# Patient Record
Sex: FEMALE | Race: WHITE | HISPANIC OR LATINO | Employment: PART TIME | URBAN - METROPOLITAN AREA
[De-identification: names, ages, dates, MRNs, and addresses within clinical notes are randomized per-mention and may not be internally consistent; named-entity substitution may affect disease eponyms.]

---

## 2024-03-13 ENCOUNTER — OFFICE VISIT (OUTPATIENT)
Dept: MEDICAL GROUP | Facility: MEDICAL CENTER | Age: 24
End: 2024-03-13
Payer: MEDICAID

## 2024-03-13 VITALS
OXYGEN SATURATION: 96 % | SYSTOLIC BLOOD PRESSURE: 86 MMHG | DIASTOLIC BLOOD PRESSURE: 60 MMHG | BODY MASS INDEX: 18.29 KG/M2 | WEIGHT: 109.8 LBS | RESPIRATION RATE: 16 BRPM | TEMPERATURE: 98.1 F | HEART RATE: 75 BPM | HEIGHT: 65 IN

## 2024-03-13 DIAGNOSIS — Z87.42 HISTORY OF OVARIAN CYST: ICD-10-CM

## 2024-03-13 DIAGNOSIS — Z23 NEED FOR VACCINATION: ICD-10-CM

## 2024-03-13 DIAGNOSIS — Z13.29 SCREENING FOR ENDOCRINE, NUTRITIONAL, METABOLIC AND IMMUNITY DISORDER: ICD-10-CM

## 2024-03-13 DIAGNOSIS — R06.89 DIFFICULTY BREATHING: ICD-10-CM

## 2024-03-13 DIAGNOSIS — R53.82 CHRONIC FATIGUE: ICD-10-CM

## 2024-03-13 DIAGNOSIS — Z13.228 SCREENING FOR ENDOCRINE, NUTRITIONAL, METABOLIC AND IMMUNITY DISORDER: ICD-10-CM

## 2024-03-13 DIAGNOSIS — Z13.21 SCREENING FOR ENDOCRINE, NUTRITIONAL, METABOLIC AND IMMUNITY DISORDER: ICD-10-CM

## 2024-03-13 DIAGNOSIS — Z13.0 SCREENING FOR ENDOCRINE, NUTRITIONAL, METABOLIC AND IMMUNITY DISORDER: ICD-10-CM

## 2024-03-13 PROCEDURE — 99204 OFFICE O/P NEW MOD 45 MIN: CPT

## 2024-03-13 PROCEDURE — 3078F DIAST BP <80 MM HG: CPT

## 2024-03-13 PROCEDURE — 3074F SYST BP LT 130 MM HG: CPT

## 2024-03-13 PROCEDURE — 99213 OFFICE O/P EST LOW 20 MIN: CPT | Mod: XU

## 2024-03-13 PROCEDURE — 90471 IMMUNIZATION ADMIN: CPT

## 2024-03-13 SDOH — ECONOMIC STABILITY: HOUSING INSECURITY: IN THE LAST 12 MONTHS, HOW MANY PLACES HAVE YOU LIVED?: 1

## 2024-03-13 SDOH — ECONOMIC STABILITY: FOOD INSECURITY: WITHIN THE PAST 12 MONTHS, THE FOOD YOU BOUGHT JUST DIDN'T LAST AND YOU DIDN'T HAVE MONEY TO GET MORE.: NEVER TRUE

## 2024-03-13 SDOH — ECONOMIC STABILITY: TRANSPORTATION INSECURITY
IN THE PAST 12 MONTHS, HAS LACK OF TRANSPORTATION KEPT YOU FROM MEETINGS, WORK, OR FROM GETTING THINGS NEEDED FOR DAILY LIVING?: NO

## 2024-03-13 SDOH — ECONOMIC STABILITY: TRANSPORTATION INSECURITY
IN THE PAST 12 MONTHS, HAS THE LACK OF TRANSPORTATION KEPT YOU FROM MEDICAL APPOINTMENTS OR FROM GETTING MEDICATIONS?: NO

## 2024-03-13 SDOH — HEALTH STABILITY: PHYSICAL HEALTH: ON AVERAGE, HOW MANY MINUTES DO YOU ENGAGE IN EXERCISE AT THIS LEVEL?: 10 MIN

## 2024-03-13 SDOH — ECONOMIC STABILITY: INCOME INSECURITY: IN THE LAST 12 MONTHS, WAS THERE A TIME WHEN YOU WERE NOT ABLE TO PAY THE MORTGAGE OR RENT ON TIME?: NO

## 2024-03-13 SDOH — ECONOMIC STABILITY: FOOD INSECURITY: WITHIN THE PAST 12 MONTHS, YOU WORRIED THAT YOUR FOOD WOULD RUN OUT BEFORE YOU GOT MONEY TO BUY MORE.: NEVER TRUE

## 2024-03-13 SDOH — ECONOMIC STABILITY: HOUSING INSECURITY
IN THE LAST 12 MONTHS, WAS THERE A TIME WHEN YOU DID NOT HAVE A STEADY PLACE TO SLEEP OR SLEPT IN A SHELTER (INCLUDING NOW)?: NO

## 2024-03-13 SDOH — ECONOMIC STABILITY: INCOME INSECURITY: HOW HARD IS IT FOR YOU TO PAY FOR THE VERY BASICS LIKE FOOD, HOUSING, MEDICAL CARE, AND HEATING?: SOMEWHAT HARD

## 2024-03-13 SDOH — ECONOMIC STABILITY: TRANSPORTATION INSECURITY
IN THE PAST 12 MONTHS, HAS LACK OF RELIABLE TRANSPORTATION KEPT YOU FROM MEDICAL APPOINTMENTS, MEETINGS, WORK OR FROM GETTING THINGS NEEDED FOR DAILY LIVING?: NO

## 2024-03-13 SDOH — HEALTH STABILITY: PHYSICAL HEALTH: ON AVERAGE, HOW MANY DAYS PER WEEK DO YOU ENGAGE IN MODERATE TO STRENUOUS EXERCISE (LIKE A BRISK WALK)?: 2 DAYS

## 2024-03-13 SDOH — HEALTH STABILITY: MENTAL HEALTH
STRESS IS WHEN SOMEONE FEELS TENSE, NERVOUS, ANXIOUS, OR CAN'T SLEEP AT NIGHT BECAUSE THEIR MIND IS TROUBLED. HOW STRESSED ARE YOU?: VERY MUCH

## 2024-03-13 ASSESSMENT — SOCIAL DETERMINANTS OF HEALTH (SDOH)
HOW OFTEN DO YOU ATTEND CHURCH OR RELIGIOUS SERVICES?: 1 TO 4 TIMES PER YEAR
HOW OFTEN DO YOU ATTENT MEETINGS OF THE CLUB OR ORGANIZATION YOU BELONG TO?: 1 TO 4 TIMES PER YEAR
HOW OFTEN DO YOU HAVE SIX OR MORE DRINKS ON ONE OCCASION: NEVER
HOW OFTEN DO YOU GET TOGETHER WITH FRIENDS OR RELATIVES?: ONCE A WEEK
DO YOU BELONG TO ANY CLUBS OR ORGANIZATIONS SUCH AS CHURCH GROUPS UNIONS, FRATERNAL OR ATHLETIC GROUPS, OR SCHOOL GROUPS?: YES
HOW MANY DRINKS CONTAINING ALCOHOL DO YOU HAVE ON A TYPICAL DAY WHEN YOU ARE DRINKING: 1 OR 2
ARE YOU MARRIED, WIDOWED, DIVORCED, SEPARATED, NEVER MARRIED, OR LIVING WITH A PARTNER?: NEVER MARRIED
IN A TYPICAL WEEK, HOW MANY TIMES DO YOU TALK ON THE PHONE WITH FAMILY, FRIENDS, OR NEIGHBORS?: MORE THAN THREE TIMES A WEEK
WITHIN THE PAST 12 MONTHS, YOU WORRIED THAT YOUR FOOD WOULD RUN OUT BEFORE YOU GOT THE MONEY TO BUY MORE: NEVER TRUE
HOW OFTEN DO YOU ATTEND CHURCH OR RELIGIOUS SERVICES?: 1 TO 4 TIMES PER YEAR
ARE YOU MARRIED, WIDOWED, DIVORCED, SEPARATED, NEVER MARRIED, OR LIVING WITH A PARTNER?: NEVER MARRIED
IN A TYPICAL WEEK, HOW MANY TIMES DO YOU TALK ON THE PHONE WITH FAMILY, FRIENDS, OR NEIGHBORS?: MORE THAN THREE TIMES A WEEK
HOW OFTEN DO YOU HAVE A DRINK CONTAINING ALCOHOL: 2-4 TIMES A MONTH
HOW OFTEN DO YOU ATTENT MEETINGS OF THE CLUB OR ORGANIZATION YOU BELONG TO?: 1 TO 4 TIMES PER YEAR
HOW HARD IS IT FOR YOU TO PAY FOR THE VERY BASICS LIKE FOOD, HOUSING, MEDICAL CARE, AND HEATING?: SOMEWHAT HARD
HOW OFTEN DO YOU GET TOGETHER WITH FRIENDS OR RELATIVES?: ONCE A WEEK
DO YOU BELONG TO ANY CLUBS OR ORGANIZATIONS SUCH AS CHURCH GROUPS UNIONS, FRATERNAL OR ATHLETIC GROUPS, OR SCHOOL GROUPS?: YES

## 2024-03-13 ASSESSMENT — PATIENT HEALTH QUESTIONNAIRE - PHQ9
SUM OF ALL RESPONSES TO PHQ QUESTIONS 1-9: 4
5. POOR APPETITE OR OVEREATING: 0 - NOT AT ALL
CLINICAL INTERPRETATION OF PHQ2 SCORE: 1

## 2024-03-13 ASSESSMENT — LIFESTYLE VARIABLES
AUDIT-C TOTAL SCORE: 2
SKIP TO QUESTIONS 9-10: 1
HOW MANY STANDARD DRINKS CONTAINING ALCOHOL DO YOU HAVE ON A TYPICAL DAY: 1 OR 2
HOW OFTEN DO YOU HAVE SIX OR MORE DRINKS ON ONE OCCASION: NEVER
HOW OFTEN DO YOU HAVE A DRINK CONTAINING ALCOHOL: 2-4 TIMES A MONTH

## 2024-03-13 NOTE — ASSESSMENT & PLAN NOTE
Patient reports that 2-3 years ago she was experiencing a lot of pain and was told she had an ovarian cyst. At this time she did not have any insurance and did not follow up. She denies that she has any current pain except some mild cramping while on her menses. She reports regular menses and moderate flow. She would like to follow up with an OB/GYN in regards.

## 2024-03-13 NOTE — ASSESSMENT & PLAN NOTE
Patient feels that over the last year she feels more and more fatigued. She thinks that it may be related to her nutrition. She does not eat breakfast, eats fast food for lunch, and for dinner she will eat eggs or quesadilla. She is currently working [employer] part time. She denies any abnormal bleeding.

## 2024-03-13 NOTE — LETTER
[facility]  ANGELA Pham.  [address]   [place] [address]  Fax: [phone]   Authorization for Release/Disclosure of   Protected Health Information   Name: JORDI CISNEROS : 2000 SSN: [id number]   Address: [facility] [address]   [place] [address] Phone:    There are no phone numbers on file.   I authorize the entity listed below to release/disclose the PHI below to:   [facility]/ENMA Pham and ENMA Pham   Provider or Entity Name:  [facility]   City, State, [place], [place] Phone:      Fax:     Reason for request: continuity of care   Information to be released:    [  ] LAST COLONOSCOPY,  including any PATH REPORT and follow-up  [  ] LAST FIT/COLOGUARD RESULT [  ] LAST DEXA  [  ] LAST MAMMOGRAM  [  ] LAST PAP  [  ] LAST LABS [  ] RETINA EXAM REPORT  [  ] IMMUNIZATION RECORDS  [  ] Release all info      [  ] Check here and initial the line next to each item to release ALL health information INCLUDING  _____ Care and treatment for drug and / or alcohol abuse  _____ HIV testing, infection status, or AIDS  _____ Genetic Testing    DATES OF SERVICE OR TIME PERIOD TO BE DISCLOSED: _____________  I understand and acknowledge that:  * This Authorization may be revoked at any time by you in writing, except if your health information has already been used or disclosed.  * Your health information that will be used or disclosed as a result of you signing this authorization could be re-disclosed by the recipient. If this occurs, your re-disclosed health information may no longer be protected by State or Federal laws.  * You may refuse to sign this Authorization. Your refusal will not affect your ability to obtain treatment.  * This Authorization becomes effective upon signing and will  on (date) __________.      If no date is indicated, this Authorization will  one (1) year from the signature date.    Name: Jordi Nunez  Octavio  Signature: Date:   3/13/2024     PLEASE FAX REQUESTED RECORDS BACK TO: [phone]

## 2024-03-13 NOTE — PROGRESS NOTES
"Subjective:     CC:  Diagnoses of History of ovarian cyst, Need for vaccination, Difficulty breathing, Chronic fatigue, Screening for endocrine, nutritional, metabolic and immunity disorder, and BMI less than 19,adult were pertinent to this visit.    HISTORY OF THE PRESENT ILLNESS: Patient is a 24 y.o. female. This pleasant patient is here today to establish care.    History of ovarian cyst  Patient reports that 2-3 years ago she was experiencing a lot of pain and was told she had an ovarian cyst. At this time she did not have any insurance and did not follow up. She denies that she has any current pain except some mild cramping while on her menses. She reports regular menses and moderate flow. She would like to follow up with an OB/GYN in regards.     Difficulty breathing  Patient report that for the last few months she has noticed that her uvula was swelling and making it difficult for her to breath. She has also noticed \"pimples\" on her posterior pharynx. She still has her tonsils. She denies that the swelling has ever occluded her airway. She has not noticed that the swelling of her uvula is related to any foods. She reports that the swelling is getting worse and worse. She denies any dysphagia or choking on foods or fluids. She denies that her throat is ever sore.     Chronic fatigue  Patient feels that over the last year she feels more and more fatigued. She thinks that it may be related to her nutrition. She does not eat breakfast, eats fast food for lunch, and for dinner she will eat eggs or quesadilla. She is currently working [employer] part time. She denies any abnormal bleeding.       Health Maintenance: reviewed and completed per patient preference.     ROS:   - CONSTITUTIONAL: Denies weight loss, fever and chills.  - HEENT: Denies changes in vision and hearing.  - RESPIRATORY: Denies SOB and cough.  - CV: Denies palpitations and CP.  - GI: Denies abdominal pain, nausea, vomiting and diarrhea.  - : " Denies dysuria and urinary frequency.  - MSK: Denies myalgia and joint pain.  - SKIN: Denies rash and pruritus.  - NEUROLOGICAL: Denies headache and syncope.  - PSYCHIATRIC: Denies recent changes in mood. Denies anxiety and depression.           Social History     Socioeconomic History    Marital status: Single     Spouse name: Not on file    Number of children: Not on file    Years of education: Not on file    Highest education level: Bachelor's degree (e.g., BA, AB, BS)   Occupational History    Not on file   Tobacco Use    Smoking status: Never    Smokeless tobacco: Never   Vaping Use    Vaping Use: Never used   Substance and Sexual Activity    Alcohol use: Yes     Comment: social drinker    Drug use: Never    Sexual activity: Not Currently     Partners: Male   Other Topics Concern    Not on file   Social History Narrative    Not on file     Social Determinants of Health     Financial Resource Strain: Medium Risk (3/13/2024)    Overall Financial Resource Strain (CARDIA)     Difficulty of Paying Living Expenses: Somewhat hard   Food Insecurity: No Food Insecurity (3/13/2024)    Hunger Vital Sign     Worried About Running Out of Food in the Last Year: Never true     Ran Out of Food in the Last Year: Never true   Transportation Needs: No Transportation Needs (3/13/2024)    PRAPARE - Transportation     Lack of Transportation (Medical): No     Lack of Transportation (Non-Medical): No   Physical Activity: Insufficiently Active (3/13/2024)    Exercise Vital Sign     Days of Exercise per Week: 2 days     Minutes of Exercise per Session: 10 min   Stress: Stress Concern Present (3/13/2024)    [employer] - Occupational Stress Questionnaire     Feeling of Stress : Very much   Social Connections: Moderately Integrated (3/13/2024)    Social Connection and Isolation Panel [NHANES]     Frequency of Communication with Friends and Family: More than three times a week     Frequency of Social Gatherings  "with Friends and Family: Once a week     Attends Jew Services: 1 to 4 times per year     Active Member of Clubs or Organizations: Yes     Attends Club or Organization Meetings: 1 to 4 times per year     Marital Status: Never    Intimate Partner Violence: Not on file   Housing Stability: Low Risk  (3/13/2024)    Housing Stability Vital Sign     Unable to Pay for Housing in the Last Year: No     Number of Places Lived in the Last Year: 1     Unstable Housing in the Last Year: No      Not on File       No current outpatient medications on file.   Objective:     Exam: BP (!) 86/60   Pulse 75   Temp 36.7 °C (98.1 °F) (Temporal)   Resp 16   Ht 1.651 m (5' 5\")   Wt 49.8 kg (109 lb 12.8 oz)   SpO2 96%  Body mass index is 18.27 kg/m².    Physical Exam:  Constitutional: Alert, no distress, well-groomed.  Skin: Warm, dry, good turgor, no rashes in visible areas.  Eye: Equal, round and reactive, conjunctiva clear, lids normal.  ENMT: Lips without lesions, good dentition, moist mucous membranes.  Neck: Trachea midline, no masses, no thyromegaly.  Respiratory: Unlabored respiratory effort, no cough.  Abd: soft, non tender, non distended, normal BS  MSK: Normal gait, moves all extremities.  Neuro: Grossly non-focal.   Psych: Alert and oriented x3, normal affect and mood.    Depression Screening    Little interest or pleasure in doing things?  0 - not at all   Feeling down, depressed , or hopeless? 1 - several days   Trouble falling or staying asleep, or sleeping too much?  1 - several days   Feeling tired or having little energy?  1 - several days   Poor appetite or overeating?  0 - not at all   Feeling bad about yourself - or that you are a failure or have let yourself or your family down? 0 - not at all   Trouble concentrating on things, such as reading the newspaper or watching television? 1 - several days   Moving or speaking so slowly that other people could have noticed.  Or the opposite - being so fidgety " or restless that you have been moving around a lot more than usual?  0 - not at all   Thoughts that you would be better off dead, or of hurting yourself?  0 - not at all   Patient Health Questionnaire Score: 4       If depressive symptoms identified deferred to follow up visit unless specifically addressed in assesment and plan.    Interpretation of PHQ-9 Total Score   Score Severity   1-4 No Depression   5-9 Mild Depression   10-14 Moderate Depression   15-19 Moderately Severe Depression   20-27 Severe Depression    Labs: Reviewed    Assessment & Plan:   24 y.o. female with the following -    1. History of ovarian cyst  Chronic, unclear if cyst is still present. FU with pelvic US. She would like to see OB/GYN for her pap smear. We will request her previous providers records and follow up.   - US-PELVIC COMPLETE (TRANSABDOMINAL/TRANSVAGINAL) (COMBO); Future  - Referral to OB/Gyn    2. Need for vaccination  Patient requests vaccination today. Vaccine given. Patient tolerated well. Follow up precautions given.   - INFLUENZA VACCINE QUAD INJ (PF)    3. Difficulty breathing  Acute, etiology unsure prognosis uncertain. She reports intermittent swelling of her uvula. Denies any airway compromise. Strict ER precautions given. She denies any underlying triggers such as exposures to food.    - Referral to ENT    4. Chronic fatigue  Chronic, etiology unsure prognosis uncertain. We discussed that it would possibly be related to her mood, diet, and activity levels. We discussed following up with labs to identify other causes. I recommended more vegetables, fruits, fish, nuts, olive oil and exercising 5 times a week for 30 minutes. We also discussed following up with a psychologist as it may be related to be her mood as her symptoms began around the same time she graduated college.   - TSH WITH REFLEX TO FT4; Future  - Comp Metabolic Panel; Future  - CBC WITHOUT DIFFERENTIAL; Future  - Referral to Psychology  - VITAMIN D,25  HYDROXY (DEFICIENCY); Future    5. Screening for endocrine, nutritional, metabolic and immunity disorder  - HIV AG/AB COMBO ASSAY SCREENING; Future  - HEP C VIRUS ANTIBODY; Future  - Chlamydia/GC, PCR (Urine); Future    6. BMI less than 19,adult  Patient reports that her diet isn't the best and she feels int may be contributing to her fatigue. BMI is 18.27.   - Referral to Nutrition Services        Return in about 6 months (around 9/13/2024).    Please note that this dictation was created using voice recognition software. I have made every reasonable attempt to correct obvious errors, but I expect that there are errors of grammar and possibly content that I did not discover before finalizing the note.

## 2024-03-13 NOTE — ASSESSMENT & PLAN NOTE
"Patient report that for the last few months she has noticed that her uvula was swelling and making it difficult for her to breath. She has also noticed \"pimples\" on her posterior pharynx. She still has her tonsils. She denies that the swelling has ever occluded her airway. She has not noticed that the swelling of her uvula is related to any foods. She reports that the swelling is getting worse and worse. She denies any dysphagia or choking on foods or fluids. She denies that her throat is ever sore.   "

## 2024-04-17 ENCOUNTER — APPOINTMENT (OUTPATIENT)
Dept: LAB | Facility: MEDICAL CENTER | Age: 24
End: 2024-04-17
Payer: MEDICAID

## 2024-04-29 ENCOUNTER — HOSPITAL ENCOUNTER (OUTPATIENT)
Dept: RADIOLOGY | Facility: MEDICAL CENTER | Age: 24
End: 2024-04-29
Payer: MEDICAID

## 2024-04-29 DIAGNOSIS — Z87.42 HISTORY OF OVARIAN CYST: ICD-10-CM

## 2024-04-29 PROCEDURE — 76830 TRANSVAGINAL US NON-OB: CPT

## 2024-05-01 ENCOUNTER — HOSPITAL ENCOUNTER (OUTPATIENT)
Dept: LAB | Facility: MEDICAL CENTER | Age: 24
End: 2024-05-01
Payer: MEDICAID

## 2024-05-01 DIAGNOSIS — Z13.0 SCREENING FOR ENDOCRINE, NUTRITIONAL, METABOLIC AND IMMUNITY DISORDER: ICD-10-CM

## 2024-05-01 DIAGNOSIS — R53.82 CHRONIC FATIGUE: ICD-10-CM

## 2024-05-01 DIAGNOSIS — Z13.228 SCREENING FOR ENDOCRINE, NUTRITIONAL, METABOLIC AND IMMUNITY DISORDER: ICD-10-CM

## 2024-05-01 DIAGNOSIS — Z13.21 SCREENING FOR ENDOCRINE, NUTRITIONAL, METABOLIC AND IMMUNITY DISORDER: ICD-10-CM

## 2024-05-01 DIAGNOSIS — Z13.29 SCREENING FOR ENDOCRINE, NUTRITIONAL, METABOLIC AND IMMUNITY DISORDER: ICD-10-CM

## 2024-05-01 LAB
ALBUMIN SERPL BCP-MCNC: 4.5 G/DL (ref 3.2–4.9)
ALBUMIN/GLOB SERPL: 1.7 G/DL
ALP SERPL-CCNC: 77 U/L (ref 30–99)
ALT SERPL-CCNC: 14 U/L (ref 2–50)
ANION GAP SERPL CALC-SCNC: 12 MMOL/L (ref 7–16)
AST SERPL-CCNC: 16 U/L (ref 12–45)
BILIRUB SERPL-MCNC: 0.3 MG/DL (ref 0.1–1.5)
BUN SERPL-MCNC: 8 MG/DL (ref 8–22)
C TRACH DNA SPEC QL NAA+PROBE: NEGATIVE
CALCIUM ALBUM COR SERPL-MCNC: 8.8 MG/DL (ref 8.5–10.5)
CALCIUM SERPL-MCNC: 9.2 MG/DL (ref 8.5–10.5)
CHLORIDE SERPL-SCNC: 102 MMOL/L (ref 96–112)
CO2 SERPL-SCNC: 23 MMOL/L (ref 20–33)
CREAT SERPL-MCNC: 0.54 MG/DL (ref 0.5–1.4)
ERYTHROCYTE [DISTWIDTH] IN BLOOD BY AUTOMATED COUNT: 42.2 FL (ref 35.9–50)
GFR SERPLBLD CREATININE-BSD FMLA CKD-EPI: 132 ML/MIN/1.73 M 2
GLOBULIN SER CALC-MCNC: 2.6 G/DL (ref 1.9–3.5)
GLUCOSE SERPL-MCNC: 81 MG/DL (ref 65–99)
HCT VFR BLD AUTO: 45.3 % (ref 37–47)
HGB BLD-MCNC: 15.4 G/DL (ref 12–16)
MCH RBC QN AUTO: 29.5 PG (ref 27–33)
MCHC RBC AUTO-ENTMCNC: 34 G/DL (ref 32.2–35.5)
MCV RBC AUTO: 86.8 FL (ref 81.4–97.8)
N GONORRHOEA DNA SPEC QL NAA+PROBE: NEGATIVE
PLATELET # BLD AUTO: 243 K/UL (ref 164–446)
PMV BLD AUTO: 9.8 FL (ref 9–12.9)
POTASSIUM SERPL-SCNC: 4 MMOL/L (ref 3.6–5.5)
PROT SERPL-MCNC: 7.1 G/DL (ref 6–8.2)
RBC # BLD AUTO: 5.22 M/UL (ref 4.2–5.4)
SODIUM SERPL-SCNC: 137 MMOL/L (ref 135–145)
SPECIMEN SOURCE: NORMAL
WBC # BLD AUTO: 4.7 K/UL (ref 4.8–10.8)

## 2024-05-02 LAB
25(OH)D3 SERPL-MCNC: 22 NG/ML (ref 30–100)
HCV AB SER QL: NORMAL
HIV 1+2 AB+HIV1 P24 AG SERPL QL IA: NORMAL
TSH SERPL DL<=0.005 MIU/L-ACNC: 1.04 UIU/ML (ref 0.38–5.33)

## 2024-05-21 ENCOUNTER — OFFICE VISIT (OUTPATIENT)
Dept: BEHAVIORAL HEALTH | Facility: PSYCHIATRIC FACILITY | Age: 24
End: 2024-05-21
Payer: MEDICAID

## 2024-05-21 DIAGNOSIS — F43.21 ADJUSTMENT DISORDER WITH DEPRESSED MOOD: ICD-10-CM

## 2024-05-21 PROCEDURE — 90791 PSYCH DIAGNOSTIC EVALUATION: CPT | Performed by: PSYCHOLOGIST

## 2024-05-21 NOTE — PROGRESS NOTES
[facility]  Psychotherapy Summary Note    Full therapy note has been documented and is under restricted viewing.  Please see below for summary of today's session.     Patient Name: Shanon Cunningham  Patient MRN: [id number]  Today's Date:  5/21/2024    Type of session: intake assessment  Session start time: 3:00 pm  Session stop time: 4:00 pm  Length of time spent face to face with patient: 60 minutes  Persons in attendance: patient    Diagnoses: Adjustment disorder with depressed and anxious mood    Symptoms currently being addressed in therapy: depression and anxiety    Therapeutic Intervention(s): CBT    Treatment Goal(s)/Objective(s) addressed: To process emotions associated with [place]ing [place] a different city, while examining and managing current and past life stressors.     Progress toward Treatment Goals: N/A - This was an intake session    Plan:      - Continue weekly therapy.    Anayansi Lombardero, Ph.D.

## 2024-05-28 ENCOUNTER — APPOINTMENT (OUTPATIENT)
Dept: BEHAVIORAL HEALTH | Facility: PSYCHIATRIC FACILITY | Age: 24
End: 2024-05-28
Payer: MEDICAID

## 2024-06-04 ENCOUNTER — TELEMEDICINE (OUTPATIENT)
Dept: BEHAVIORAL HEALTH | Facility: PSYCHIATRIC FACILITY | Age: 24
End: 2024-06-04
Payer: MEDICAID

## 2024-06-04 DIAGNOSIS — F43.22 ADJUSTMENT DISORDER WITH ANXIOUS MOOD: ICD-10-CM

## 2024-06-04 PROCEDURE — 90834 PSYTX W PT 45 MINUTES: CPT | Performed by: PSYCHOLOGIST

## 2024-06-11 ENCOUNTER — TELEMEDICINE (OUTPATIENT)
Dept: BEHAVIORAL HEALTH | Facility: PSYCHIATRIC FACILITY | Age: 24
End: 2024-06-11
Payer: MEDICAID

## 2024-06-11 ENCOUNTER — APPOINTMENT (OUTPATIENT)
Dept: BEHAVIORAL HEALTH | Facility: PSYCHIATRIC FACILITY | Age: 24
End: 2024-06-11
Payer: MEDICAID

## 2024-06-11 DIAGNOSIS — F43.22 ADJUSTMENT DISORDER WITH ANXIOUS MOOD: ICD-10-CM

## 2024-06-11 PROCEDURE — 90834 PSYTX W PT 45 MINUTES: CPT | Mod: GT | Performed by: PSYCHOLOGIST

## 2024-06-18 ENCOUNTER — TELEMEDICINE (OUTPATIENT)
Dept: BEHAVIORAL HEALTH | Facility: PSYCHIATRIC FACILITY | Age: 24
End: 2024-06-18
Payer: MEDICAID

## 2024-06-18 DIAGNOSIS — F43.22 ADJUSTMENT DISORDER WITH ANXIOUS MOOD: ICD-10-CM

## 2024-06-18 PROCEDURE — 90834 PSYTX W PT 45 MINUTES: CPT | Mod: GT | Performed by: PSYCHOLOGIST

## 2024-06-18 NOTE — PROGRESS NOTES
[facility]  Psychotherapy Summary Note    Full therapy note has been documented and is under restricted viewing.  Please see below for summary of today's session.     Patient Name: Shanon Cunningham  Patient MRN: [id number]  Today's Date:  6/04/2024    Type of session: individual psychotherapy  Session start time: 10:00 am   Session stop time: 10:45 am   Length of time spent face to face with patient: 45 minutes   Persons in attendance: patient    Diagnoses: Adjustment disorder with anxious and depressed mood    Symptoms currently being addressed in therapy: depression, anxiety    Therapeutic Intervention(s): ACT    Treatment Goal(s)/Objective(s) addressed: To manage transitional stage in life using ACT skills     Progress toward Treatment Goals: No change    Plan:      - Continue weekly therapy.    Anayansi Lombardero, Ph.D.

## 2024-06-18 NOTE — PSYCHOTHERAPY
PROGRESS NOTE    Procedure: 45-minute behavioral health treatment of anxious mood associated with a move and life transition.    S) This is a follow-up psychotherapy session for the 24-year-old female who was self-referred for treatment of anxious mood. Patient reported moving sooner than expected and having difficulty sharing a bathroom as well as decluttering since her space is now much smaller. Discussed family relationships and patient's values around kindness. Agreed that kindness encompasses being kind to herself by setting boundaries. Agreed to meet again one week from today's session.     O)   Appearance:  Well-groomed, appropriately dressed for the weather conditions.   Speech: Normal rate, rhythm, volume, and prosody.   Mood: Current mood described as stressed.   Affect: Congruent/Reactive  Thought Process: Logical/linear  Thought Content: Within Normal Limits  Insight/Judgment: Intact/adequate  Cognition/Orientation: Behaviorally Stable, Alert Oriented x 4  Behavior: Appropriate level of self-disclosure, friendly, and very polite.        A) This was the first follow-up psychotherapy session for the 24-year-old female who was self-referred for anxious mood. Patient has many protective factors, including a high level of intelligence and commitment to doing the work that is required to achieve her goals. She is also very kind and very polite. Some of her challenges center around holding very high standards for herself, probably stemming from high standards imposed on her by her parents. Prognosis is good, given patient's previous success with psychotherapy, and given her high level of engagement. Current challenges consist of maintaining harmonious relationships with family, learning to set boundaries, and asserting her independence.       Diagnostic Impression:  Adjustment Disorder with Anxious Mood.     P)   1. RTC: One week from today's session.

## 2024-06-18 NOTE — PROGRESS NOTES
[facility]  Psychotherapy Summary Note     Full therapy note has been documented and is under restricted viewing.  Please see below for summary of today's session.      Patient Name: Shanon Cunningham  Patient MRN: [id number]  Today's Date:  6/11/2024     Type of session: individual psychotherapy  Session start time: 1:00 pm   Session stop time: 1:45 pm   Length of time spent face to face with patient: 45 minutes   Persons in attendance: patient     Diagnoses: Adjustment disorder with anxious and depressed mood     Symptoms currently being addressed in therapy: depression, anxiety     Therapeutic Intervention(s): ACT     Treatment Goal(s)/Objective(s) addressed: To manage transitional stage in life using ACT skills      Progress toward Treatment Goals: Mild improvement     Plan:      - Continue weekly therapy.     Anayansi Lombardero, Ph.D.      This evaluation was conducted via Zoom using secure and encrypted videoconferencing technology. The patient was in their home in the [facility].    The patient's identity was confirmed and verbal consent was obtained for this virtual visit.

## 2024-06-18 NOTE — PROGRESS NOTES
[facility]  Psychotherapy Summary Note     Full therapy note has been documented and is under restricted viewing.  Please see below for summary of today's session.      Patient Name: Shanon Cunningham  Patient MRN: [id number]  Today's Date:  6/18/2024     Type of session: individual psychotherapy  Session start time: 1:00 pm   Session stop time: 1:45 pm   Length of time spent face to face with patient: 45 minutes   Persons in attendance: patient     Diagnoses: Adjustment disorder with anxious and depressed mood     Symptoms currently being addressed in therapy: depression, anxiety     Therapeutic Intervention(s): ACT     Treatment Goal(s)/Objective(s) addressed: To manage transitional stage in life using ACT skills      Progress toward Treatment Goals: Mild improvement     Plan:      - Continue weekly therapy.     Anayansi Lombardero, Ph.D.      This evaluation was conducted via Zoom using secure and encrypted videoconferencing technology. The patient was in their home in the [facility].    The patient's identity was confirmed and verbal consent was obtained for this virtual visit.

## 2024-06-18 NOTE — PSYCHOTHERAPY
PROGRESS NOTE     Procedure: 45-minute behavioral health treatment of anxious mood associated with a move and life transition.     S) This is a follow-up psychotherapy session for the 24-year-old female who was self-referred for treatment of anxious mood. Patient reported doing well regarding mood and anxiety but finding herself irritable with her younger brothers. Discussed ACT strategies for coping with irritability, including acceptance and perspective-taking. Followed up regarding procrastination, which patient admitted had been a problem over the past week. Discussed kindness toward herself, and patient said she could join a gym. She reported engaging in an argument with her brother but resolving it a couple of days later. Discussed an upcoming [place] trip, which patient feels ambivalent about whether to attend. Asked patient to think about what she would regret the most, after the fact, and to write a list of pros and cons. She is also planning a trip to [place] and to [facility], which we will discuss in more detail during our next session.     Goals for the next week include:      - To continue to be kind to herself (join a gym)  - To communicate how she she feels  - To organize her living space (wake up earlier)     Agreed to meet again one week from today's session.      O)   Appearance:  Well-groomed, appropriately dressed for the weather conditions.   Speech: Normal rate, rhythm, volume, and prosody.   Mood: Current mood described as stressed.   Affect: Congruent/Reactive  Thought Process: Logical/linear  Thought Content: Within Normal Limits  Insight/Judgment: Intact/adequate  Cognition/Orientation: Behaviorally Stable, Alert Oriented x 4  Behavior: Appropriate level of self-disclosure, friendly, and very polite.         A) This was the first follow-up psychotherapy session for the 24-year-old female who was self-referred for anxious mood. Patient has many protective factors, including a high level of  intelligence and commitment to doing the work that is required to achieve her goals. She is also very kind and very polite. Some of her challenges center around holding very high standards for herself, probably stemming from high standards imposed on her by her parents. Prognosis is good, given patient's previous success with psychotherapy, and given her high level of engagement. Current challenges consist of maintaining harmonious relationships with family, learning to set boundaries, and asserting her independence.         Diagnostic Impression:  Adjustment Disorder with Anxious Mood.      P)   1. RTC: One week from today's session.

## 2024-06-18 NOTE — PSYCHOTHERAPY
PROGRESS NOTE     Procedure: 45-minute behavioral health treatment of anxious mood associated with a move and life transition.     S) This is a follow-up psychotherapy session for the 24-year-old female who was self-referred for treatment of anxious mood. Patient reported having a good week overall but having difficulty with having to share a space with her two brothers. She shared being upset with her brother for inviting his girlfriend over when the kitchen sink was full of dishes. She had asked him to clean the dishes prior to her visit but he did not do so. Discussed effective communication strategies as well as values clarification. Patient's values are to be a kind person, to be fair, and to be an advocate for others. Her homework assignment for next week is as follows:    - To continue to be kind to herself  - To communicate how she she feels  - To organize her living space    Agreed to meet again one week from today's session.      O)   Appearance:  Well-groomed, appropriately dressed for the weather conditions.   Speech: Normal rate, rhythm, volume, and prosody.   Mood: Current mood described as stressed.   Affect: Congruent/Reactive  Thought Process: Logical/linear  Thought Content: Within Normal Limits  Insight/Judgment: Intact/adequate  Cognition/Orientation: Behaviorally Stable, Alert Oriented x 4  Behavior: Appropriate level of self-disclosure, friendly, and very polite.         A) This was the first follow-up psychotherapy session for the 24-year-old female who was self-referred for anxious mood. Patient has many protective factors, including a high level of intelligence and commitment to doing the work that is required to achieve her goals. She is also very kind and very polite. Some of her challenges center around holding very high standards for herself, probably stemming from high standards imposed on her by her parents. Prognosis is good, given patient's previous success with psychotherapy, and  given her high level of engagement. Current challenges consist of maintaining harmonious relationships with family, learning to set boundaries, and asserting her independence.         Diagnostic Impression:  Adjustment Disorder with Anxious Mood.      P)   1. RTC: One week from today's session.

## 2024-06-25 ENCOUNTER — TELEMEDICINE (OUTPATIENT)
Dept: BEHAVIORAL HEALTH | Facility: PSYCHIATRIC FACILITY | Age: 24
End: 2024-06-25
Payer: MEDICAID

## 2024-06-25 DIAGNOSIS — F43.22 ADJUSTMENT DISORDER WITH ANXIOUS MOOD: ICD-10-CM

## 2024-06-25 NOTE — PROGRESS NOTES
[facility]  Psychotherapy Summary Note     Full therapy note has been documented and is under restricted viewing.  Please see below for summary of today's session.      Patient Name: Shanon Cunningham  Patient MRN: [id number]  Today's Date:  6/25/2024     Type of session: individual psychotherapy  Session start time: 10:00 am   Session stop time: 10:45 am   Length of time spent face to face with patient: 45 minutes   Persons in attendance: patient     Diagnoses: Adjustment disorder with anxious and depressed mood     Symptoms currently being addressed in therapy: depression, anxiety     Therapeutic Intervention(s): ACT     Treatment Goal(s)/Objective(s) addressed: To manage transitional stage in life using ACT skills      Progress toward Treatment Goals: Moderate improvement     Plan:      - Continue weekly therapy.     Anayansi Lombardero, Ph.D.      This evaluation was conducted via Zoom using secure and encrypted videoconferencing technology. The patient was in their home in the [facility].    The patient's identity was confirmed and verbal consent was obtained for this virtual visit.

## 2024-06-25 NOTE — PSYCHOTHERAPY
PROGRESS NOTE     Procedure: 45-minute behavioral health treatment of anxious mood associated with a move and life transition.     S) This is a follow-up psychotherapy session for the 24-year-old female who was self-referred for treatment of anxious mood. Patient reported doing well regarding mood and anxiety and explained that she came to [facility], for the weekend to celebrate a friend's birthday. Checked in regarding homework, and patient said she had organized her living space. Congratulated patient on this accomplishment. She said her brother invited her to his gym, but she did not go. Discussed goals of communicating her needs around letting her family borrow her car, and asking for some advanced notice. Most of the session was spent discussing her decision around going on a [employer] camping trip. Patient's cons included: fear that she may not get along with her friend, and have a fight with them and end their friendship, fear that she will not be able to sleep well, fear around having to do 17-hour road trip where she is not driving, and that the trip would be expensive. Pros included having a new experience, learning to camp in a safe environment and possibly taking her family camping once she learns, spending quality time with friends, spending time in nature, and that it may be really fun. Discussed whether patient was making decisions based on fear, the downsides of that, and discussed friendships, conflict, and resolving conflict.      Ongoing goals include:      - To continue to be kind to herself (join a gym)  - To communicate how she she feels  - To organize her living space (wake up earlier)     Agreed to meet again one week from today's session.      O)   Appearance:  Well-groomed, appropriately dressed for the weather conditions.   Speech: Normal rate, rhythm, volume, and prosody.   Mood: Current mood described as stressed.   Affect: Congruent/Reactive  Thought Process: Logical/linear  Thought  Content: Within Normal Limits  Insight/Judgment: Intact/adequate  Cognition/Orientation: Behaviorally Stable, Alert Oriented x 4  Behavior: Appropriate level of self-disclosure, friendly, and very polite.         A) This was the first follow-up psychotherapy session for the 24-year-old female who was self-referred for anxious mood. Patient has many protective factors, including a high level of intelligence and commitment to doing the work that is required to achieve her goals. She is also very kind and very polite. Some of her challenges center around holding very high standards for herself, probably stemming from high standards imposed on her by her parents. Prognosis is good, given patient's previous success with psychotherapy, and given her high level of engagement. Current challenges consist of maintaining harmonious relationships with family, learning to set boundaries, and asserting her independence.         Diagnostic Impression:  Adjustment Disorder with Anxious Mood.      P)   1. RTC: One week from today's session.

## 2024-07-02 ENCOUNTER — APPOINTMENT (OUTPATIENT)
Dept: BEHAVIORAL HEALTH | Facility: PSYCHIATRIC FACILITY | Age: 24
End: 2024-07-02
Payer: MEDICAID

## 2024-07-02 DIAGNOSIS — F43.22 ADJUSTMENT DISORDER WITH ANXIOUS MOOD: ICD-10-CM

## 2024-07-02 PROCEDURE — 90834 PSYTX W PT 45 MINUTES: CPT | Mod: GT | Performed by: PSYCHOLOGIST

## 2024-07-16 ENCOUNTER — TELEMEDICINE (OUTPATIENT)
Dept: BEHAVIORAL HEALTH | Facility: PSYCHIATRIC FACILITY | Age: 24
End: 2024-07-16
Payer: MEDICAID

## 2024-07-16 DIAGNOSIS — F43.22 ADJUSTMENT DISORDER WITH ANXIOUS MOOD: ICD-10-CM

## 2024-07-16 PROCEDURE — 90834 PSYTX W PT 45 MINUTES: CPT | Performed by: PSYCHOLOGIST

## 2024-07-23 ENCOUNTER — TELEMEDICINE (OUTPATIENT)
Dept: BEHAVIORAL HEALTH | Facility: PSYCHIATRIC FACILITY | Age: 24
End: 2024-07-23
Payer: MEDICAID

## 2024-07-23 DIAGNOSIS — F40.10 SOCIAL ANXIETY DISORDER: ICD-10-CM

## 2024-07-23 PROCEDURE — 90834 PSYTX W PT 45 MINUTES: CPT | Performed by: PSYCHOLOGIST

## 2024-07-30 ENCOUNTER — APPOINTMENT (OUTPATIENT)
Dept: BEHAVIORAL HEALTH | Facility: PSYCHIATRIC FACILITY | Age: 24
End: 2024-07-30
Payer: MEDICAID

## 2024-08-06 ENCOUNTER — APPOINTMENT (OUTPATIENT)
Dept: BEHAVIORAL HEALTH | Facility: PSYCHIATRIC FACILITY | Age: 24
End: 2024-08-06
Payer: MEDICAID

## 2024-08-06 NOTE — PROGRESS NOTES
Met briefly with patient on Teams, but the connection did not allow for a psychotherapy session, as I could not make out with patient said, and we got disconnected several times. The plan is to meet next week as scheduled, and I messaged patient to confirm.

## 2024-08-13 ENCOUNTER — TELEMEDICINE (OUTPATIENT)
Dept: BEHAVIORAL HEALTH | Facility: PSYCHIATRIC FACILITY | Age: 24
End: 2024-08-13
Payer: MEDICAID

## 2024-08-13 DIAGNOSIS — F40.10 SOCIAL ANXIETY DISORDER: ICD-10-CM

## 2024-08-13 PROCEDURE — 90834 PSYTX W PT 45 MINUTES: CPT | Performed by: PSYCHOLOGIST

## 2024-08-13 NOTE — PROGRESS NOTES
[facility]  Psychotherapy Summary Note     Full therapy note has been documented and is under restricted viewing.  Please see below for summary of today's session.      Patient Name: Shanon Cunningham  Patient MRN: [id number]  Today's Date:  8/13/2024     Type of session: individual psychotherapy  Session start time: 1:00 pm   Session stop time: 1:45 pm   Length of time spent face to face with patient: 45 minutes   Persons in attendance: patient     Diagnoses: Adjustment disorder with anxious and depressed mood     Symptoms currently being addressed in therapy: depression, anxiety     Therapeutic Intervention(s): ACT     Treatment Goal(s)/Objective(s) addressed: To manage transitional stage in life using ACT skills      Progress toward Treatment Goals: Moderate improvement     Plan:      - Continue weekly therapy.     Anayansi Lombardero, Ph.D.      This evaluation was conducted via Zoom using secure and encrypted videoconferencing technology. The patient was in their home in the [facility].    The patient's identity was confirmed and verbal consent was obtained for this virtual visit.

## 2024-08-13 NOTE — PSYCHOTHERAPY
PROGRESS NOTE     Procedure: 45-minute behavioral health treatment of anxious mood associated with a move and life transition.     S) This is a follow-up psychotherapy session for the 24-year-old female who was self-referred for treatment of anxious mood. Patient reported doing well regarding mood and anxiety. Patient reported a successful camping trip with her [employer], in that she effectively engaged in exposure to social situations.  Today's session consisted of reviewing materials from a CBT treatment manual for social anxiety. Patient identified being self-conscious about having bad posture and about being too thin. Validated patient's concerns but challenged cognitive distortions associated with what other people may be thinking. Patient agreed to do the following for next week:    Read chapter 3 (chapter 4 for her) and work on her fear hierarchy  Optional: Read chapter 5 and work on identifying cognitive distortions associated with social anxiety.   -----    Physical Symptoms:  -Shakiness  Cognitions:  -I will blank out  -I will look dumb  -Others will notice my hair is frizzy  -Others will notice my glasses are dirty  -Others will notice my skin     Patient added that she believes her social anxiety is associated with low self-esteem, and she pointed out that she dislikes being the center of attention. She said she mostly fears new situations and new people. Patient added that her insurance is running out in mid-August. Agreed on a plan to have patient purchase the treatment manual, and that we would work on an abbreviated treatment that patient can continue on her own. The plan for the next 3-4 sessions includes:     -Develop a fear hierarchy  -Teach cognitive tools for identifying and challenging automatic thoughts  -Develop self-coping statements  -Start first exposure as soon as possible   -Encourage patient to engage in as many exposures as possible        Ongoing goals include:      - To continue to be  kind to herself (join a gym)  - To communicate how she she feels  - To organize her living space (wake up earlier)     Agreed to meet again one week from today's session.      O)   Appearance:  Well-groomed, appropriately dressed for the weather conditions.   Speech: Normal rate, rhythm, volume, and prosody.   Mood: Current mood described as stressed.   Affect: Congruent/Reactive  Thought Process: Logical/linear  Thought Content: Within Normal Limits  Insight/Judgment: Intact/adequate  Cognition/Orientation: Behaviorally Stable, Alert Oriented x 4  Behavior: Appropriate level of self-disclosure, friendly, and very polite.         A) This was the first follow-up psychotherapy session for the 24-year-old female who was self-referred for anxious mood. Patient has many protective factors, including a high level of intelligence and commitment to doing the work that is required to achieve her goals. She is also very kind and very polite. Some of her challenges center around holding very high standards for herself, probably stemming from high standards imposed on her by her parents. Prognosis is good, given patient's previous success with psychotherapy, and given her high level of engagement. Current challenges consist of maintaining harmonious relationships with family, learning to set boundaries, and asserting her independence.         Diagnostic Impression:  Adjustment Disorder with Anxious Mood.      P)   1. RTC: One week from today's session.

## 2024-08-20 ENCOUNTER — TELEMEDICINE (OUTPATIENT)
Dept: BEHAVIORAL HEALTH | Facility: PSYCHIATRIC FACILITY | Age: 24
End: 2024-08-20
Payer: MEDICAID

## 2024-08-20 DIAGNOSIS — F43.21 ADJUSTMENT DISORDER WITH DEPRESSED MOOD: ICD-10-CM

## 2024-08-20 DIAGNOSIS — F40.10 SOCIAL ANXIETY DISORDER: ICD-10-CM

## 2024-08-20 PROCEDURE — 90834 PSYTX W PT 45 MINUTES: CPT | Performed by: PSYCHOLOGIST

## 2024-08-27 ENCOUNTER — APPOINTMENT (OUTPATIENT)
Dept: BEHAVIORAL HEALTH | Facility: PSYCHIATRIC FACILITY | Age: 24
End: 2024-08-27
Payer: MEDICAID

## 2024-08-27 NOTE — PSYCHOTHERAPY
"PROGRESS NOTE     Procedure: 45-minute behavioral health treatment of anxious mood associated with a move and life transition.     S) This is a follow-up psychotherapy session for the 24-year-old female who was self-referred for treatment of anxious mood. Patient reported going through a stressful time because she ran into an ex-boyfriend and because a friend of hers caught patient's current boyfriend \"dancing inappropriately\" with another girl and sent patient video recordings of this behavior. Patient reported experiencing a number of cognitive distortions, such as \"I will not be able to trust a man again,\" and \"I am not good looking enough\" and this is why this  happened. Helped patient process her emotions around this incident, and discussed breakups as involving grief and patience and kindness with her emotional experience as she goes through these stages. Patient agreed. Encouraged patient to start looking for possibilities for therapists as her insurance changes. Agreed to meet again one week from today's session.        Read chapter 3 (chapter 4 for her) and work on her fear hierarchy  Optional: Read chapter 5 and work on identifying cognitive distortions associated with social anxiety.   -----     Physical Symptoms:  -Shakiness  Cognitions:  -I will blank out  -I will look dumb  -Others will notice my hair is frizzy  -Others will notice my glasses are dirty  -Others will notice my skin     Patient added that she believes her social anxiety is associated with low self-esteem, and she pointed out that she dislikes being the center of attention. She said she mostly fears new situations and new people. Patient added that her insurance is running out in mid-August. Agreed on a plan to have patient purchase the treatment manual, and that we would work on an abbreviated treatment that patient can continue on her own. The plan for the next 3-4 sessions includes:     -Develop a fear hierarchy  -Teach cognitive tools " for identifying and challenging automatic thoughts  -Develop self-coping statements  -Start first exposure as soon as possible   -Encourage patient to engage in as many exposures as possible        Ongoing goals include:      - To continue to be kind to herself (join a gym)  - To communicate how she she feels  - To organize her living space (wake up earlier)     Agreed to meet again one week from today's session.      O)   Appearance:  Well-groomed, appropriately dressed for the weather conditions.   Speech: Normal rate, rhythm, volume, and prosody.   Mood: Current mood described as stressed.   Affect: Congruent/Reactive  Thought Process: Logical/linear  Thought Content: Within Normal Limits  Insight/Judgment: Intact/adequate  Cognition/Orientation: Behaviorally Stable, Alert Oriented x 4  Behavior: Appropriate level of self-disclosure, friendly, and very polite.         A) This was the first follow-up psychotherapy session for the 24-year-old female who was self-referred for anxious mood. Patient has many protective factors, including a high level of intelligence and commitment to doing the work that is required to achieve her goals. She is also very kind and very polite. Some of her challenges center around holding very high standards for herself, probably stemming from high standards imposed on her by her parents. Prognosis is good, given patient's previous success with psychotherapy, and given her high level of engagement. Current challenges consist of maintaining harmonious relationships with family, learning to set boundaries, and asserting her independence.         Diagnostic Impression:  Adjustment Disorder with Anxious Mood.      P)   1. RTC: One week from today's session.

## 2024-08-27 NOTE — PROGRESS NOTES
[facility]  Psychotherapy Summary Note     Full therapy note has been documented and is under restricted viewing.  Please see below for summary of today's session.      Patient Name: Shanon Cunningham  Patient MRN: [id number]  Today's Date:  8/20/2024     Type of session: individual psychotherapy  Session start time: 1:00 pm   Session stop time: 1:45 pm   Length of time spent face to face with patient: 45 minutes   Persons in attendance: patient     Diagnoses: Adjustment disorder with anxious and depressed mood     Symptoms currently being addressed in therapy: depression, anxiety     Therapeutic Intervention(s): ACT     Treatment Goal(s)/Objective(s) addressed: To manage transitional stage in life using ACT skills      Progress toward Treatment Goals: Moderate improvement     Plan:      - Continue weekly therapy.     Anayansi Lombardero, Ph.D.      This evaluation was conducted via Zoom using secure and encrypted videoconferencing technology. The patient was in their home in the [facility].    The patient's identity was confirmed and verbal consent was obtained for this virtual visit.